# Patient Record
Sex: MALE | Race: OTHER | HISPANIC OR LATINO | URBAN - METROPOLITAN AREA
[De-identification: names, ages, dates, MRNs, and addresses within clinical notes are randomized per-mention and may not be internally consistent; named-entity substitution may affect disease eponyms.]

---

## 2023-09-08 ENCOUNTER — HOSPITAL ENCOUNTER (EMERGENCY)
Facility: HOSPITAL | Age: 53
Discharge: HOME | End: 2023-09-08
Attending: EMERGENCY MEDICINE

## 2023-09-08 VITALS
RESPIRATION RATE: 18 BRPM | HEIGHT: 66 IN | BODY MASS INDEX: 22.5 KG/M2 | WEIGHT: 140 LBS | TEMPERATURE: 98 F | HEART RATE: 87 BPM | SYSTOLIC BLOOD PRESSURE: 110 MMHG | OXYGEN SATURATION: 100 % | DIASTOLIC BLOOD PRESSURE: 69 MMHG

## 2023-09-08 DIAGNOSIS — R10.9 ABDOMINAL PAIN: Primary | ICD-10-CM

## 2023-09-08 LAB
ALBUMIN SERPL-MCNC: 4.4 G/DL (ref 3.3–5.5)
ALP SERPL-CCNC: 78 U/L (ref 42–141)
BILIRUB SERPL-MCNC: 0.6 MG/DL (ref 0.2–1.6)
BILIRUBIN, POC UA: NEGATIVE
BLOOD, POC UA: NEGATIVE
BUN SERPL-MCNC: 22 MG/DL (ref 7–22)
CALCIUM SERPL-MCNC: 9.2 MG/DL (ref 8–10.3)
CHLORIDE SERPL-SCNC: 101 MMOL/L (ref 98–108)
CLARITY, POC UA: ABNORMAL
COLOR, POC UA: ABNORMAL
CREAT SERPL-MCNC: 0.7 MG/DL (ref 0.6–1.2)
GLUCOSE SERPL-MCNC: 217 MG/DL (ref 73–118)
GLUCOSE, POC UA: NEGATIVE
KETONES, POC UA: ABNORMAL
LEUKOCYTE EST, POC UA: NEGATIVE
NITRITE, POC UA: NEGATIVE
PH UR STRIP: 5 [PH]
POC ALT (SGPT): 32 U/L (ref 10–47)
POC AST (SGOT): 35 U/L (ref 11–38)
POC TCO2: 29 MMOL/L (ref 18–33)
POTASSIUM BLD-SCNC: 4.4 MMOL/L (ref 3.6–5.1)
PROTEIN, POC UA: ABNORMAL
PROTEIN, POC: 7.5 G/DL (ref 6.4–8.1)
SODIUM BLD-SCNC: 138 MMOL/L (ref 128–145)
SPECIFIC GRAVITY, POC UA: >=1.03
UROBILINOGEN, POC UA: 0.2 E.U./DL

## 2023-09-08 PROCEDURE — 80053 COMPREHEN METABOLIC PANEL: CPT | Mod: ER

## 2023-09-08 PROCEDURE — 25000003 PHARM REV CODE 250: Mod: ER

## 2023-09-08 PROCEDURE — 99284 EMERGENCY DEPT VISIT MOD MDM: CPT | Mod: 25,ER

## 2023-09-08 RX ORDER — FAMOTIDINE 20 MG/1
20 TABLET, FILM COATED ORAL 2 TIMES DAILY
Qty: 20 TABLET | Refills: 0 | Status: SHIPPED | OUTPATIENT
Start: 2023-09-08 | End: 2024-09-07

## 2023-09-08 RX ORDER — MAG HYDROX/ALUMINUM HYD/SIMETH 200-200-20
30 SUSPENSION, ORAL (FINAL DOSE FORM) ORAL
Status: COMPLETED | OUTPATIENT
Start: 2023-09-08 | End: 2023-09-08

## 2023-09-08 RX ORDER — MAG HYDROX/ALUMINUM HYD/SIMETH 200-200-20
30 SUSPENSION, ORAL (FINAL DOSE FORM) ORAL EVERY 6 HOURS PRN
Qty: 769 ML | Refills: 0 | Status: SHIPPED | OUTPATIENT
Start: 2023-09-08 | End: 2024-09-07

## 2023-09-08 RX ORDER — SODIUM CHLORIDE 9 MG/ML
1000 INJECTION, SOLUTION INTRAVENOUS
Status: COMPLETED | OUTPATIENT
Start: 2023-09-08 | End: 2023-09-08

## 2023-09-08 RX ADMIN — ALUMINUM HYDROXIDE, MAGNESIUM HYDROXIDE, AND DIMETHICONE 30 ML: 200; 20; 200 SUSPENSION ORAL at 09:09

## 2023-09-08 RX ADMIN — SODIUM CHLORIDE 1000 ML: 9 INJECTION, SOLUTION INTRAVENOUS at 09:09

## 2023-09-08 NOTE — ED PROVIDER NOTES
"Encounter Date: 9/8/2023       History     Chief Complaint   Patient presents with    Abdominal Pain     C/o generalized abdominal pain since yesterday after eating bad food. Denies nausea/vomiting. Pain 8/10. Took Pepto Bismol with no relief.     Anival Villanueva is a 52-year-old male with no pertinent past medical history who presents to the emergency department with a chief complaint of abdominal pain.  Two days ago, he ate a food item from a dollar store, and 4 hours later he developed epigastric abdominal pain.  Since then, his abdominal pain has become somewhat generalized.  Describes the pain as 8/10 in severity and "burning" in nature.  He denies any nausea, vomiting, diarrhea, or fever.  He has been able to continue drinking fluids and has been drinking an electrolyte drink.  Reports decreased appetite.  Took Pepto at home with minimal relief.    The history is provided by the patient. A  was used ([facility]  Azar # [id number]).     Review of patient's allergies indicates:  No Known Allergies  Past Medical History:   Diagnosis Date    Diabetes mellitus      History reviewed. No pertinent surgical history.  History reviewed. No pertinent family history.  Social History     Tobacco Use    Smoking status: Never     Passive exposure: Never    Smokeless tobacco: Never   Substance Use Topics    Alcohol use: Never    Drug use: Never     Review of Systems   Constitutional:  Negative for chills and fever.   HENT:  Negative for sore throat.    Respiratory:  Negative for shortness of breath.    Cardiovascular:  Negative for chest pain.   Gastrointestinal:  Positive for abdominal pain. Negative for nausea.   Genitourinary:  Negative for dysuria.   Musculoskeletal:  Negative for back pain.   Skin:  Negative for rash.   Neurological:  Negative for weakness.   Hematological:  Does not bruise/bleed easily.       Physical Exam     Initial Vitals [09/08/23 0833]   BP Pulse " Resp Temp SpO2   131/79 99 18 97.9 °F (36.6 °C) 100 %      MAP       --         Physical Exam    Nursing note and vitals reviewed.  Constitutional: Vital signs are normal. He appears well-developed and well-nourished. He is cooperative. He does not appear ill. No distress.   HENT:   Head: Normocephalic and atraumatic.   Right Ear: Hearing and external ear normal.   Left Ear: Hearing and external ear normal.   Nose: Nose normal.   Mouth/Throat: Oropharynx is clear and moist and mucous membranes are normal. No posterior oropharyngeal edema or posterior oropharyngeal erythema.   Eyes: Conjunctivae and EOM are normal.   Neck: Phonation normal. Neck supple. No stridor present.    Full passive range of motion without pain.     Cardiovascular:  Normal rate, regular rhythm, S1 normal, S2 normal and normal heart sounds.  No extrasystoles are present.          No murmur heard.  Pulmonary/Chest: Effort normal and breath sounds normal. No accessory muscle usage. No tachypnea. No respiratory distress. He exhibits no tenderness.   Abdominal: Abdomen is soft and flat. Bowel sounds are normal. He exhibits pulsatile midline mass. There is generalized abdominal tenderness and tenderness in the epigastric area.   Abdomen with generalized tenderness palpation, slightly worse in the epigastrium.  There is a pulsatile mass throughout the medial aspect of the abdomen.  Patient reports some tenderness to palpation when palpating this mass.   No right CVA tenderness.  No left CVA tenderness. There is no rebound and no guarding.   Musculoskeletal:      Cervical back: Full passive range of motion without pain and neck supple. No spinous process tenderness. Normal range of motion.     Neurological: He is alert and oriented to person, place, and time. GCS eye subscore is 4. GCS verbal subscore is 5. GCS motor subscore is 6.   Skin: Skin is warm and dry. Capillary refill takes less than 2 seconds. No rash noted. No pallor.         ED Course    Procedures  Labs Reviewed   POCT URINALYSIS W/O SCOPE - Abnormal; Notable for the following components:       Result Value    Ketones, UA Trace (*)     Spec Grav UA >=1.030 (*)     Protein, UA 2+ (*)     All other components within normal limits   POCT CMP - Abnormal; Notable for the following components:    POC Glucose 217 (*)     All other components within normal limits   POCT URINALYSIS W/O SCOPE   POCT CBC   POCT CMP          Imaging Results              US Abdominal Aorta (Final result)  Result time 09/08/23 09:51:41      Final result by Jose F Pinzon III, MD (09/08/23 09:51:41)                   Impression:      Normal ultrasound of the aorta and iliac vessels.      Electronically signed by: Jose F Pinzon MD  Date:    09/08/2023  Time:    09:51               Narrative:    EXAMINATION:  US ABDOMINAL AORTA    CLINICAL HISTORY:  Unspecified abdominal pain    FINDINGS:  Proximal aorta 2.6 x 2.6 cm.    Mid aorta 1.9 x 1.9 cm.    Distal aorta 1.7 x 1.9 cm.    Right iliac 1 cm.    Left iliac 0.69 cm.    Peak systolic velocity aorta 69 cm/second.    There is no evidence of dissection stenosis aneurysm or thrombus.                                       Medications   0.9%  NaCl infusion (0 mLs Intravenous Stopped 9/8/23 0941)   aluminum-magnesium hydroxide-simethicone 200-200-20 mg/5 mL suspension 30 mL (30 mLs Oral Given 9/8/23 0942)     Medical Decision Making  52-year-old male presenting to the emergency department with abdominal pain, epigastric and generalized that started after eating some dollar store food.  No nausea, vomiting, diarrhea, or fever.  On physical exam, patient appears uncomfortable but is in no acute distress.  Generalized and epigastric tenderness to palpation, pulsatile mass through the medial aspect of the abdomen.    Differential diagnosis is broad and includes but is not limited to gastritis, gastroenteritis, dehydration, appendicitis, food poisoning, abdominal aortic aneurysm,  pancreatitis, cholecystitis, diverticulitis, peritonitis, small-bowel obstruction, epiploic appendagitis, constipation, urinary tract infection including cystitis or pyelonephritis, musculoskeletal pain.    Labs reassuring, no leukocytosis, no anemia.  CMP with elevated glucose at 217 mg/dL, otherwise within normal limits.  Urinalysis with trace ketones, 2+ protein, high specific gravity.  With slightly tender pulsatile mass in the abdomen, ultrasound of the abdominal aorta was obtained and returned normal.  No signs of dissection or aneurysm.  Treated patient's abdominal pain with IV fluids and Maalox with almost complete resolution of symptoms.  With this positive response in reassuring studies, likely mild gastritis/gastroenteritis.  Maalox and Pepcid electronically prescribed and sent to the patient's preferred pharmacy.  Follow up with primary care.    Return precautions were discussed, all patient questions were answered, and the patient was agreeable to the plan of care.  He was discharged home in stable condition and will follow up with his primary care provider or return to the emergency department if his symptoms worsen or do not improve.     Amount and/or Complexity of Data Reviewed  Labs: ordered. Decision-making details documented in ED Course.  Radiology: ordered.    Risk  OTC drugs.  Prescription drug management.                               Clinical Impression:   Final diagnoses:  [R10.9] Abdominal pain (Primary)        ED Disposition Condition    Discharge Stable          ED Prescriptions       Medication Sig Dispense Start Date End Date Auth. Provider    aluminum-magnesium hydroxide-simethicone (MAALOX ADVANCED) 200-200-20 mg/5 mL Susp Take 30 mLs by mouth every 6 (six) hours as needed. 769 mL 9/8/2023 9/7/2024 Braydon Zuniga, PAAleenaC    famotidine (PEPCID) 20 MG tablet Take 1 tablet (20 mg total) by mouth 2 (two) times daily. 20 tablet 9/8/2023 9/7/2024 Braydon Zuniga, PAAleenaC          Follow-up  Information       Follow up With Specialties Details Why Contact Info    [facility] -  Schedule an appointment as soon as possible for a visit  As needed, If symptoms worsen [address] [place] [address]  [phone]      [facility] ED Emergency Medicine Go to  If symptoms worsen [id number]  [facility] [address] [phone]             Braydon Zuniga, PA-C  09/08/23 1040

## 2023-09-08 NOTE — DISCHARGE INSTRUCTIONS
